# Patient Record
Sex: MALE | Race: WHITE | Employment: FULL TIME | URBAN - METROPOLITAN AREA
[De-identification: names, ages, dates, MRNs, and addresses within clinical notes are randomized per-mention and may not be internally consistent; named-entity substitution may affect disease eponyms.]

---

## 2020-08-11 ENCOUNTER — OFFICE VISIT (OUTPATIENT)
Dept: URGENT CARE | Facility: URGENT CARE | Age: 40
End: 2020-08-11
Payer: COMMERCIAL

## 2020-08-11 VITALS
SYSTOLIC BLOOD PRESSURE: 107 MMHG | WEIGHT: 167.6 LBS | RESPIRATION RATE: 20 BRPM | DIASTOLIC BLOOD PRESSURE: 71 MMHG | TEMPERATURE: 99 F | OXYGEN SATURATION: 98 % | HEART RATE: 80 BPM
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
## 2020-08-11 PROCEDURE — 90471 IMMUNIZATION ADMIN: CPT | Performed by: NURSE PRACTITIONER

## 2020-08-11 PROCEDURE — 99203 OFFICE O/P NEW LOW 30 MIN: CPT | Mod: 25 | Performed by: NURSE PRACTITIONER

## 2020-08-11 PROCEDURE — 90715 TDAP VACCINE 7 YRS/> IM: CPT | Performed by: NURSE PRACTITIONER

## 2020-08-11 RX ORDER — DOXYCYCLINE 100 MG/1
100 CAPSULE ORAL 2 TIMES DAILY
Qty: 14 CAPSULE | Refills: 0 | Status: SHIPPED | OUTPATIENT
Start: 2020-08-11 | End: 2020-08-18

## 2020-08-11 ASSESSMENT — ENCOUNTER SYMPTOMS
RHINORRHEA: 0
VOMITING: 0
SORE THROAT: 0
CHILLS: 0
SHORTNESS OF BREATH: 0
DIARRHEA: 0
NECK PAIN: 1
COUGH: 0
NAUSEA: 0
FEVER: 0
DIAPHORESIS: 0

## 2020-08-11 NOTE — PROGRESS NOTES
SUBJECTIVE:   Reed Arellano is a 40 year old male presenting with a chief complaint of   Chief Complaint   Patient presents with     Puncture Wound     Left hand poked by scott nail on Saturday. Jaw pain, trouble swallowing, all over muscle aches. Does not remember having a tetanus shot as an adult        He is a new patient of [facility].    Subjective  Reed Arellano is a 40-year-old male presenting to urgent care with a puncture wound on the left hand.  He was poked by a scott nail on Saturday while at work.  He states that the wound has healed but he is having mild jaw pain, neck pain  slight difficulty in swallowing.  He is also admitting to muscle aches.  He does not remember getting the tetanus shot as an adult.  He does not remember having a tetanus shot as an adult.  Denies chest pain, fever, cough, shortness of breath.  He denies any exposure to coronavirus.    Review of Systems   Constitutional: Negative for chills, diaphoresis and fever.   HENT: Negative for congestion, ear pain, rhinorrhea and sore throat.         Jaw pain.   Respiratory: Negative for cough and shortness of breath.    Gastrointestinal: Negative for diarrhea, nausea and vomiting.        Mild difficulty in swallowing.   Musculoskeletal: Positive for neck pain.        Muscle aches.   Skin:        Puncture wounds to the left hand.   All other systems reviewed and are negative.      History reviewed. No pertinent past medical history.  History reviewed. No pertinent family history.  Current Outpatient Medications   Medication Sig Dispense Refill     doxycycline hyclate (VIBRAMYCIN) 100 MG capsule Take 1 capsule (100 mg) by mouth 2 times daily for 7 days 14 capsule 0     Social History     Tobacco Use     Smoking status: Current Every Day Smoker     Types: Cigarettes     Smokeless tobacco: Never Used   Substance Use Topics     Alcohol use: Not on file       OBJECTIVE  /71   Pulse 80   Temp 99  F (37.2  C) (Tympanic)   Resp 20   Wt 76  kg (167 lb 9.6 oz)   SpO2 98%     Physical Exam  Vitals signs and nursing note reviewed.   Constitutional:       General: He is not in acute distress.     Appearance: He is well-developed. He is not diaphoretic.   HENT:      Head: Normocephalic and atraumatic.      Jaw: There is normal jaw occlusion. No tenderness, swelling or pain on movement.      Right Ear: Tympanic membrane and external ear normal.      Left Ear: Tympanic membrane and external ear normal.   Eyes:      Pupils: Pupils are equal, round, and reactive to light.   Neck:      Musculoskeletal: Normal range of motion and neck supple.   Pulmonary:      Effort: Pulmonary effort is normal. No respiratory distress.      Breath sounds: Normal breath sounds. No rales.   Chest:      Chest wall: No tenderness.   Musculoskeletal:      Comments: Cervical spine exam:   No focal tenderness is noted along spinous processes.    Range of Motion is intact in all planes.   Lymphadenopathy:      Cervical: No cervical adenopathy.   Skin:     General: Skin is warm and dry.      Comments: To dotted areas on the left hand with mild tenderness.   Neurological:      Mental Status: He is alert.      Cranial Nerves: No cranial nerve deficit.         ASSESSMENT:      ICD-10-CM    1. Puncture wound of skin from metal nail  W45.0XXA doxycycline hyclate (VIBRAMYCIN) 100 MG capsule     TDAP, IM (10 - 64 YRS) - Adacel            PLAN:  Plan of treatment is discussed.The benefits, risks and potential side effects of medications discussed. Black box warning discussed as relevant.  All questions are answered.  Instructions were given  to follow up immediately if any adverse reactions or worsening symptoms develop.   Understanding of plan of care was verbalized by patient        Patient Instructions     Patient Education     Tetanus Antibody  Does this test have other names?  Vaccine responsiveness test  What is this test?  This test looks for tetanus antibody in your blood.  Tetanus is a  serious disease caused by the toxin from Clostridium tetani bacteria. The toxin makes its way into the nervous system and causes muscle spasms and rigid muscles.  If you have been vaccinated for tetanus in the past, this test should show that you have enough antibodies against the disease. If your levels are too low, you will be revaccinated. The test will be repeated after at least a month. Several variations of the tetanus vaccine are available. The vaccine is recommended as a series in childhood. A booster vaccine is recommended every 10 years for teens and adults.  If you've never had a tetanus vaccine or you've been exposed to tetanus, you'll get vaccinated. You may return later to have your tetanus antibody levels checked to make sure the vaccine is working.  Why do I need this test?  You may need this test if your healthcare provider wants to make sure your immune system can protect you against tetanus. Or you may need it to see if you have a problem that prevents your immune system from working properly. This test can help diagnose:    Severe combined immunodeficiency, or SCID    Common variable immunodeficiency, or CVID    Antibody deficiencies    Immunoglobulin deficiencies    Spleen problems    HIV infection  You may also have this test if you get unusual infections, need antibiotics more often than usual, or have infections in your sinuses, ears, or digestive tract that keep coming back.  What other tests might I have along with this test?  Your healthcare provider may also order tests to check your immune system's response to the diphtheria vaccine or the Streptococcus pneumoniae vaccine.  What do my test results mean?  Test results may vary depending on your age, gender, health history, the method used for the test, and other things. Your test results may not mean you have a problem. Ask your healthcare provider what your test results mean for you.   Results are given in international units per  milliliter (IU/mL). Normal results are usually greater than 0.1 IU/mL. If the test shows your levels are at least that high, it means your immune system had a normal response to the tetanus vaccine.  How is this test done?  The test is done with a blood sample. A needle is used to draw blood from a vein in your arm or hand.   Does this test pose any risks?  Having a blood test with a needle carries some risks. These include bleeding, infection, bruising, and feeling lightheaded. When the needle pricks your arm or hand, you may feel a slight sting or pain. Afterward, the site may be sore.   Tetanus vaccine are combined with vaccines against diphtheria and sometimes pertussis. They may cause these side effects:    Pain, redness, and swelling at the injection site    Fever or headache    Fatigue    Stomach upset    Allergic reactions  What might affect my test results?  Getting gammaglobulin treatment in the 8 months before the test or immune globulin in the 5 months before the test will affect your results.  Having cancer, getting chemotherapy, or taking certain medicines that suppress the immune system can also affect your results.  How do I get ready for this test?  Tell your healthcare provider if you have cancer, are taking immune-suppressing medicine, or have received gammaglobulin or immune globulin in recent months. Be sure your healthcare provider knows about all other medicines, herbs, vitamins, and supplements you are taking. This includes medicines that don't need a prescription and any illicit drugs you may use.    9351-8318 [employer]. [address] [place] [address]. All rights reserved. This information is not intended as a substitute for professional medical care. Always follow your healthcare professional's instructions.           Patient Education     Tetanus Antibody  Does this test have other names?  Vaccine responsiveness test  What is this test?  This test looks for  tetanus antibody in your blood.  Tetanus is a serious disease caused by the toxin from Clostridium tetani bacteria. The toxin makes its way into the nervous system and causes muscle spasms and rigid muscles.  If you have been vaccinated for tetanus in the past, this test should show that you have enough antibodies against the disease. If your levels are too low, you will be revaccinated. The test will be repeated after at least a month. Several variations of the tetanus vaccine are available. The vaccine is recommended as a series in childhood. A booster vaccine is recommended every 10 years for teens and adults.  If you've never had a tetanus vaccine or you've been exposed to tetanus, you'll get vaccinated. You may return later to have your tetanus antibody levels checked to make sure the vaccine is working.  Why do I need this test?  You may need this test if your healthcare provider wants to make sure your immune system can protect you against tetanus. Or you may need it to see if you have a problem that prevents your immune system from working properly. This test can help diagnose:    Severe combined immunodeficiency, or SCID    Common variable immunodeficiency, or CVID    Antibody deficiencies    Immunoglobulin deficiencies    Spleen problems    HIV infection  You may also have this test if you get unusual infections, need antibiotics more often than usual, or have infections in your sinuses, ears, or digestive tract that keep coming back.  What other tests might I have along with this test?  Your healthcare provider may also order tests to check your immune system's response to the diphtheria vaccine or the Streptococcus pneumoniae vaccine.  What do my test results mean?  Test results may vary depending on your age, gender, health history, the method used for the test, and other things. Your test results may not mean you have a problem. Ask your healthcare provider what your test results mean for  you.   Results are given in international units per milliliter (IU/mL). Normal results are usually greater than 0.1 IU/mL. If the test shows your levels are at least that high, it means your immune system had a normal response to the tetanus vaccine.  How is this test done?  The test is done with a blood sample. A needle is used to draw blood from a vein in your arm or hand.   Does this test pose any risks?  Having a blood test with a needle carries some risks. These include bleeding, infection, bruising, and feeling lightheaded. When the needle pricks your arm or hand, you may feel a slight sting or pain. Afterward, the site may be sore.   Tetanus vaccine are combined with vaccines against diphtheria and sometimes pertussis. They may cause these side effects:    Pain, redness, and swelling at the injection site    Fever or headache    Fatigue    Stomach upset    Allergic reactions  What might affect my test results?  Getting gammaglobulin treatment in the 8 months before the test or immune globulin in the 5 months before the test will affect your results.  Having cancer, getting chemotherapy, or taking certain medicines that suppress the immune system can also affect your results.  How do I get ready for this test?  Tell your healthcare provider if you have cancer, are taking immune-suppressing medicine, or have received gammaglobulin or immune globulin in recent months. Be sure your healthcare provider knows about all other medicines, herbs, vitamins, and supplements you are taking. This includes medicines that don't need a prescription and any illicit drugs you may use.    7315-2763 [employer]. [address] [place] [address]. All rights reserved. This information is not intended as a substitute for professional medical care. Always follow your healthcare professional's instructions.           Patient Education     Immunization Information: Tetanus  You received a tetanus shot today. This  "shot was given to protect you from an infection with the tetanus bacteria. These bacteria are found in the soil. You can get a tetanus infection (also called \"lockjaw\") from a dirty wound, cut, puncture, abrasion, or other break in the skin. Tetanus can be deadly. For this reason, it is vital to be vaccinated against it. If you have never had a tetanus vaccination, 3 shots are needed to fully protect you. You received the first shot today. You can get your next 2 shots:    From your usual healthcare provider    From the local public health department    From our facility  When to have your next 2 shots    Return for your next shot in 4 weeks: _________________________________________________    Return for your last shot in 6 to 12 months:  ___________________________________________________  Once you are finished with the 3 shots, you will be fully immunized. This means you will be protected against getting tetanus for up to 10 years.  If you have an injury that is very risky before this time, you may receive a \"booster\" shot.  To help you remember the date of your last tetanus shot, write it on the back of your 's license or other ID.  When to seek medical advice  After a tetanus shot, most people have only mild soreness in the arm for a day or so. Contact your healthcare provider or this facility if you develop symptoms of allergic reaction, which include:    Pain, redness, and swelling at the injection site    Trouble breathing    Rash  Date Last Reviewed: 4/1/2018 2000-2019 [employer]. [address] [place] [address], [place] [address]. All rights reserved. This information is not intended as a substitute for professional medical care. Always follow your healthcare professional's instructions.           Patient Education     Puncture Wound    A puncture wound occurs when a pointed object pushes into the skin. It may go into the tissues below the skin, including fat and muscle. This type of wound " is narrow and deep and can be hard to clean. Because of this, puncture wounds are at high risk for becoming infected.  X-rays may be done to check the wound for objects stuck under the skin. Your may also need a tetanus shot. This is given if you are not up-to-date on this vaccination and the object that caused the wound may lead to tetanus.  Home care    Your healthcare provider may prescribe an antibiotic. This is to help prevent infection. Follow all instructions for taking this medicine. Take the medicine every day until it is gone or you are told to stop. You should not have any left over.    The healthcare provider may prescribe medicines for pain. Follow instructions for taking them.    You can take acetaminophen or ibuprofen for pain, unless you were given a different pain medicine to use.     Follow the healthcare provider s instructions on how to care for the wound.    Keep the wound clean and dry. Don't get the wound wet until you are told it is OK to do so. If the area gets wet, gently pat it dry with a clean cloth. Replace the wet bandage with a dry one.    If a bandage was applied and it becomes wet or dirty, replace it. Otherwise, leave it in place for the first 24 hours.    Once you can get the wound wet, you may shower as usual but don't soak the wound in water (no tub baths or swimming)    Even with proper treatment, a puncture wound may become infected. Check the wound daily for signs of infection listed below.  Follow-up care  Follow up with your healthcare provider, or as advised.   When to seek medical advice  Call your healthcare provider right away if any of these occur:    Signs of infection, including:  ? Increasing redness or swelling around the wound  ? Increased warmth of the wound  ? Worsening pain  ? Red streaking lines away from the wound  ? Draining pus    Fever of 100.4 F (38. C) or higher, or as directed by your healthcare provider    Wound changes colors    Numbness around the  wound    Decreased movement around the injured area  Date Last Reviewed: 3/1/2018    9575-8985 [employer]. [address] [facility], [place] [address]. All rights reserved. This information is not intended as a substitute for professional medical care. Always follow your healthcare professional's instructions.

## 2020-08-11 NOTE — PATIENT INSTRUCTIONS
Patient Education     Tetanus Antibody  Does this test have other names?  Vaccine responsiveness test  What is this test?  This test looks for tetanus antibody in your blood.  Tetanus is a serious disease caused by the toxin from Clostridium tetani bacteria. The toxin makes its way into the nervous system and causes muscle spasms and rigid muscles.  If you have been vaccinated for tetanus in the past, this test should show that you have enough antibodies against the disease. If your levels are too low, you will be revaccinated. The test will be repeated after at least a month. Several variations of the tetanus vaccine are available. The vaccine is recommended as a series in childhood. A booster vaccine is recommended every 10 years for teens and adults.  If you've never had a tetanus vaccine or you've been exposed to tetanus, you'll get vaccinated. You may return later to have your tetanus antibody levels checked to make sure the vaccine is working.  Why do I need this test?  You may need this test if your healthcare provider wants to make sure your immune system can protect you against tetanus. Or you may need it to see if you have a problem that prevents your immune system from working properly. This test can help diagnose:    Severe combined immunodeficiency, or SCID    Common variable immunodeficiency, or CVID    Antibody deficiencies    Immunoglobulin deficiencies    Spleen problems    HIV infection  You may also have this test if you get unusual infections, need antibiotics more often than usual, or have infections in your sinuses, ears, or digestive tract that keep coming back.  What other tests might I have along with this test?  Your healthcare provider may also order tests to check your immune system's response to the diphtheria vaccine or the Streptococcus pneumoniae vaccine.  What do my test results mean?  Test results may vary depending on your age, gender, health history, the method used for the  test, and other things. Your test results may not mean you have a problem. Ask your healthcare provider what your test results mean for you.   Results are given in international units per milliliter (IU/mL). Normal results are usually greater than 0.1 IU/mL. If the test shows your levels are at least that high, it means your immune system had a normal response to the tetanus vaccine.  How is this test done?  The test is done with a blood sample. A needle is used to draw blood from a vein in your arm or hand.   Does this test pose any risks?  Having a blood test with a needle carries some risks. These include bleeding, infection, bruising, and feeling lightheaded. When the needle pricks your arm or hand, you may feel a slight sting or pain. Afterward, the site may be sore.   Tetanus vaccine are combined with vaccines against diphtheria and sometimes pertussis. They may cause these side effects:    Pain, redness, and swelling at the injection site    Fever or headache    Fatigue    Stomach upset    Allergic reactions  What might affect my test results?  Getting gammaglobulin treatment in the 8 months before the test or immune globulin in the 5 months before the test will affect your results.  Having cancer, getting chemotherapy, or taking certain medicines that suppress the immune system can also affect your results.  How do I get ready for this test?  Tell your healthcare provider if you have cancer, are taking immune-suppressing medicine, or have received gammaglobulin or immune globulin in recent months. Be sure your healthcare provider knows about all other medicines, herbs, vitamins, and supplements you are taking. This includes medicines that don't need a prescription and any illicit drugs you may use.    6102-6727 [employer]. [address] [place] [address], [place] [address]. All rights reserved. This information is not intended as a substitute for professional medical care. Always follow your healthcare  professional's instructions.           Patient Education     Tetanus Antibody  Does this test have other names?  Vaccine responsiveness test  What is this test?  This test looks for tetanus antibody in your blood.  Tetanus is a serious disease caused by the toxin from Clostridium tetani bacteria. The toxin makes its way into the nervous system and causes muscle spasms and rigid muscles.  If you have been vaccinated for tetanus in the past, this test should show that you have enough antibodies against the disease. If your levels are too low, you will be revaccinated. The test will be repeated after at least a month. Several variations of the tetanus vaccine are available. The vaccine is recommended as a series in childhood. A booster vaccine is recommended every 10 years for teens and adults.  If you've never had a tetanus vaccine or you've been exposed to tetanus, you'll get vaccinated. You may return later to have your tetanus antibody levels checked to make sure the vaccine is working.  Why do I need this test?  You may need this test if your healthcare provider wants to make sure your immune system can protect you against tetanus. Or you may need it to see if you have a problem that prevents your immune system from working properly. This test can help diagnose:    Severe combined immunodeficiency, or SCID    Common variable immunodeficiency, or CVID    Antibody deficiencies    Immunoglobulin deficiencies    Spleen problems    HIV infection  You may also have this test if you get unusual infections, need antibiotics more often than usual, or have infections in your sinuses, ears, or digestive tract that keep coming back.  What other tests might I have along with this test?  Your healthcare provider may also order tests to check your immune system's response to the diphtheria vaccine or the Streptococcus pneumoniae vaccine.  What do my test results mean?  Test results may vary depending on your age, gender, health  history, the method used for the test, and other things. Your test results may not mean you have a problem. Ask your healthcare provider what your test results mean for you.   Results are given in international units per milliliter (IU/mL). Normal results are usually greater than 0.1 IU/mL. If the test shows your levels are at least that high, it means your immune system had a normal response to the tetanus vaccine.  How is this test done?  The test is done with a blood sample. A needle is used to draw blood from a vein in your arm or hand.   Does this test pose any risks?  Having a blood test with a needle carries some risks. These include bleeding, infection, bruising, and feeling lightheaded. When the needle pricks your arm or hand, you may feel a slight sting or pain. Afterward, the site may be sore.   Tetanus vaccine are combined with vaccines against diphtheria and sometimes pertussis. They may cause these side effects:    Pain, redness, and swelling at the injection site    Fever or headache    Fatigue    Stomach upset    Allergic reactions  What might affect my test results?  Getting gammaglobulin treatment in the 8 months before the test or immune globulin in the 5 months before the test will affect your results.  Having cancer, getting chemotherapy, or taking certain medicines that suppress the immune system can also affect your results.  How do I get ready for this test?  Tell your healthcare provider if you have cancer, are taking immune-suppressing medicine, or have received gammaglobulin or immune globulin in recent months. Be sure your healthcare provider knows about all other medicines, herbs, vitamins, and supplements you are taking. This includes medicines that don't need a prescription and any illicit drugs you may use.    [id number] [employer]. [address] [place] [address], [place] [address]. All rights reserved. This information is not intended as a substitute for professional medical  "care. Always follow your healthcare professional's instructions.           Patient Education     Immunization Information: Tetanus  You received a tetanus shot today. This shot was given to protect you from an infection with the tetanus bacteria. These bacteria are found in the soil. You can get a tetanus infection (also called \"lockjaw\") from a dirty wound, cut, puncture, abrasion, or other break in the skin. Tetanus can be deadly. For this reason, it is vital to be vaccinated against it. If you have never had a tetanus vaccination, 3 shots are needed to fully protect you. You received the first shot today. You can get your next 2 shots:    From your usual healthcare provider    From the local public health department    From our facility  When to have your next 2 shots    Return for your next shot in 4 weeks: _________________________________________________    Return for your last shot in 6 to 12 months:  ___________________________________________________  Once you are finished with the 3 shots, you will be fully immunized. This means you will be protected against getting tetanus for up to 10 years.  If you have an injury that is very risky before this time, you may receive a \"booster\" shot.  To help you remember the date of your last tetanus shot, write it on the back of your 's license or other ID.  When to seek medical advice  After a tetanus shot, most people have only mild soreness in the arm for a day or so. Contact your healthcare provider or this facility if you develop symptoms of allergic reaction, which include:    Pain, redness, and swelling at the injection site    Trouble breathing    Rash  Date Last Reviewed: 4/1/2018 2000-2019 [employer]. [address] [place] [address]. All rights reserved. This information is not intended as a substitute for professional medical care. Always follow your healthcare professional's instructions.           Patient Education "     Puncture Wound    A puncture wound occurs when a pointed object pushes into the skin. It may go into the tissues below the skin, including fat and muscle. This type of wound is narrow and deep and can be hard to clean. Because of this, puncture wounds are at high risk for becoming infected.  X-rays may be done to check the wound for objects stuck under the skin. Your may also need a tetanus shot. This is given if you are not up-to-date on this vaccination and the object that caused the wound may lead to tetanus.  Home care    Your healthcare provider may prescribe an antibiotic. This is to help prevent infection. Follow all instructions for taking this medicine. Take the medicine every day until it is gone or you are told to stop. You should not have any left over.    The healthcare provider may prescribe medicines for pain. Follow instructions for taking them.    You can take acetaminophen or ibuprofen for pain, unless you were given a different pain medicine to use.     Follow the healthcare provider s instructions on how to care for the wound.    Keep the wound clean and dry. Don't get the wound wet until you are told it is OK to do so. If the area gets wet, gently pat it dry with a clean cloth. Replace the wet bandage with a dry one.    If a bandage was applied and it becomes wet or dirty, replace it. Otherwise, leave it in place for the first 24 hours.    Once you can get the wound wet, you may shower as usual but don't soak the wound in water (no tub baths or swimming)    Even with proper treatment, a puncture wound may become infected. Check the wound daily for signs of infection listed below.  Follow-up care  Follow up with your healthcare provider, or as advised.   When to seek medical advice  Call your healthcare provider right away if any of these occur:    Signs of infection, including:  ? Increasing redness or swelling around the wound  ? Increased warmth of the wound  ? Worsening pain  ? Red  streaking lines away from the wound  ? Draining pus    Fever of 100.4 F (38. C) or higher, or as directed by your healthcare provider    Wound changes colors    Numbness around the wound    Decreased movement around the injured area  Date Last Reviewed: 3/1/2018    [id number] [employer]. [address] [place] [id number]. All rights reserved. This information is not intended as a substitute for professional medical care. Always follow your healthcare professional's instructions.

## 2020-11-03 ENCOUNTER — OFFICE VISIT (OUTPATIENT)
Dept: FAMILY MEDICINE | Facility: CLINIC | Age: 40
End: 2020-11-03
Payer: COMMERCIAL

## 2020-11-03 VITALS
OXYGEN SATURATION: 100 % | BODY MASS INDEX: 24.29 KG/M2 | WEIGHT: 164 LBS | SYSTOLIC BLOOD PRESSURE: 128 MMHG | DIASTOLIC BLOOD PRESSURE: 74 MMHG | HEART RATE: 79 BPM | TEMPERATURE: 98.7 F | HEIGHT: 69 IN

## 2020-11-03 DIAGNOSIS — K52.9 CHRONIC DIARRHEA: Primary | ICD-10-CM

## 2020-11-03 DIAGNOSIS — Z13.220 SCREENING FOR HYPERLIPIDEMIA: ICD-10-CM

## 2020-11-03 DIAGNOSIS — K58.0 IRRITABLE BOWEL SYNDROME WITH DIARRHEA: ICD-10-CM

## 2020-11-03 LAB
ALBUMIN SERPL-MCNC: 4.2 G/DL (ref 3.4–5)
ALP SERPL-CCNC: 128 U/L (ref 40–150)
ALT SERPL W P-5'-P-CCNC: 33 U/L (ref 0–70)
ANION GAP SERPL CALCULATED.3IONS-SCNC: 6 MMOL/L (ref 3–14)
AST SERPL W P-5'-P-CCNC: 14 U/L (ref 0–45)
BASOPHILS # BLD AUTO: 0 10E9/L (ref 0–0.2)
BASOPHILS NFR BLD AUTO: 0.3 %
BILIRUB SERPL-MCNC: 0.6 MG/DL (ref 0.2–1.3)
BUN SERPL-MCNC: 11 MG/DL (ref 7–30)
CALCIUM SERPL-MCNC: 9 MG/DL (ref 8.5–10.1)
CHLORIDE SERPL-SCNC: 106 MMOL/L (ref 94–109)
CO2 SERPL-SCNC: 27 MMOL/L (ref 20–32)
CREAT SERPL-MCNC: 0.69 MG/DL (ref 0.66–1.25)
DIFFERENTIAL METHOD BLD: ABNORMAL
EOSINOPHIL # BLD AUTO: 0.3 10E9/L (ref 0–0.7)
EOSINOPHIL NFR BLD AUTO: 3.1 %
ERYTHROCYTE [DISTWIDTH] IN BLOOD BY AUTOMATED COUNT: 11.8 % (ref 10–15)
GFR SERPL CREATININE-BSD FRML MDRD: >90 ML/MIN/{1.73_M2}
GLUCOSE SERPL-MCNC: 82 MG/DL (ref 70–99)
HCT VFR BLD AUTO: 39.9 % (ref 40–53)
HGB BLD-MCNC: 14.5 G/DL (ref 13.3–17.7)
LYMPHOCYTES # BLD AUTO: 1.9 10E9/L (ref 0.8–5.3)
LYMPHOCYTES NFR BLD AUTO: 21.5 %
MCH RBC QN AUTO: 33.6 PG (ref 26.5–33)
MCHC RBC AUTO-ENTMCNC: 36.3 G/DL (ref 31.5–36.5)
MCV RBC AUTO: 93 FL (ref 78–100)
MONOCYTES # BLD AUTO: 0.6 10E9/L (ref 0–1.3)
MONOCYTES NFR BLD AUTO: 6.8 %
NEUTROPHILS # BLD AUTO: 5.9 10E9/L (ref 1.6–8.3)
NEUTROPHILS NFR BLD AUTO: 68.3 %
PLATELET # BLD AUTO: 204 10E9/L (ref 150–450)
POTASSIUM SERPL-SCNC: 3.4 MMOL/L (ref 3.4–5.3)
PROT SERPL-MCNC: 7.1 G/DL (ref 6.8–8.8)
RBC # BLD AUTO: 4.31 10E12/L (ref 4.4–5.9)
SODIUM SERPL-SCNC: 139 MMOL/L (ref 133–144)
TSH SERPL DL<=0.005 MIU/L-ACNC: 0.89 MU/L (ref 0.4–4)
WBC # BLD AUTO: 8.7 10E9/L (ref 4–11)

## 2020-11-03 PROCEDURE — 80050 GENERAL HEALTH PANEL: CPT | Performed by: PHYSICIAN ASSISTANT

## 2020-11-03 PROCEDURE — 80061 LIPID PANEL: CPT | Performed by: PHYSICIAN ASSISTANT

## 2020-11-03 PROCEDURE — 99203 OFFICE O/P NEW LOW 30 MIN: CPT | Performed by: PHYSICIAN ASSISTANT

## 2020-11-03 PROCEDURE — 36415 COLL VENOUS BLD VENIPUNCTURE: CPT | Performed by: PHYSICIAN ASSISTANT

## 2020-11-03 ASSESSMENT — MIFFLIN-ST. JEOR: SCORE: 1626.34

## 2020-11-03 ASSESSMENT — PAIN SCALES - GENERAL: PAINLEVEL: NO PAIN (0)

## 2020-11-03 NOTE — PATIENT INSTRUCTIONS
At [facility], we strive to deliver an exceptional experience to you, every time we see you. If you receive a survey, please complete it as we do value your feedback.  If you have MyChart, you can expect to receive results automatically within 24 hours of their completion.  Your provider will send a note interpreting your results as well.   If you do not have MyChart, you should receive your results in about a week by mail.    Your care team:                            Family Medicine Internal Medicine   MD Les Harkins, MD Scar Alejandro MD Katya Georgiev PA-C Megan Hill, APRN [facility]    Toñito Campos, MD Pediatrics   Roland Titus, PADianneC  Jazzmine Christine, CNP MD Johnna Sheehan APRN CNP   MD Georgie Mchugh MD Deborah Mielke, MD Bisi Blanton, APRN CNP  Maira Hansen, PABROOKLYNN Broderick, CNP  MD Beverly Arias MD Angela Wermerskirchen, MD      Clinic hours: Monday - Thursday 7 am-7 pm; Fridays 7 am-5 pm.   Urgent care: Monday - Friday 11 am-9 pm; Saturday and Sunday 9 am-5 pm.    Clinic: [phone]       [facility]: Monday - Thursday 8 am - 7 pm; Friday 8 am - 6 pm  [facility]: [phone]     Use www.oncare.org for 24/7 diagnosis and treatment of dozens of conditions.      How to Quit Smoking  Smoking is one of the hardest habits to break. About half of all people who have ever smoked have been able to quit. Most people who still smoke want to quit. Here are some of the best ways to stop smoking.    Keep trying  Most smokers make many attempts at quitting before they are successful. It s important not to give up.  Go cold turkey  Most former smokers quit cold turkey (all at once). Trying to cut back gradually doesn't seem to work as well, perhaps because it continues the smoking habit. Also, it is possible to inhale more  while smoking fewer cigarettes. This results in the same amount of nicotine in your body.  Get support  Support programs can be a big help, especially for heavy smokers. These groups offer lectures, ways to change behavior, and peer support. Here are some ways to find a support program:    Free national quitline: 800-QUIT-NOW (387-588-3744).    [facility] quit-smoking programs.    American Lung Association: (457.982.9758).    American Cancer Society (124-358-8537).  Support at home is important too. Nonsmokers can offer praise and encouragement. If the smoker in your life finds it hard to quit, encourage them to keep trying.  Over-the-counter medicines  Nicotine replacement therapy may make quitting easier. Certain aids, such as the nicotine patch, gum, and lozenges, are available without a prescription. It is best to use these under a doctor s care, though. The skin patch provides a steady supply of nicotine. Nicotine gum and lozenges give temporary bursts of low levels of nicotine. Both methods reduce the craving for cigarettes. Warning: If you have nausea, vomiting, dizziness, weakness, or a fast heartbeat, stop using these products and see your doctor.  Prescription medicines  After reviewing your smoking patterns and past attempts to quit, your doctor may offer a prescription medicine such as bupropion, varenicline, a nicotine inhaler, or nasal spray. Each has advantages and side effects. Your doctor can review these with you.  Health benefits of quitting  The benefits of quitting start right away and keep improving the longer you go without smoking. These benefits occur at any age.  So whether you are 17 or 70, quitting is a good decision. Some of the benefits include:    20 minutes: Blood pressure and pulse return to normal.    8 hours: Oxygen levels return to normal.    2 days: Ability to smell and taste begin to improve as damaged nerves regrow.    2 to 3 weeks: Circulation and lung function improve.    1 to  "9 months: Coughing, congestion, and shortness of breath decrease; tiredness decreases.    1 year: Risk of heart attack decreases by half.    5 years: Risk of lung cancer decreases by half; risk of stroke becomes the same as a nonsmoker s.  For more on how to quit smoking, try these online resources:     Smokefree.gov    \"Clearing the Air\" booklet from the National Cancer [place]: smokefree.gov/sites/default/files/pdf/clearing-the-air-accessible.pdf  Date Last Reviewed: 3/1/2017    6208-8279 [employer]. [address] [place] [address]. All rights reserved. This information is not intended as a substitute for professional medical care. Always follow your healthcare professional's instructions.             Patient Education     Diet and Lifestyle Tips for Irritable Bowel Syndrome (IBS)    Your healthcare professional may suggest some lifestyle changes to help control your IBS. Changing your diet and managing stress are 2 of the most important. Follow your healthcare provider s instructions and try some of the suggestions below.   Change your diet  Your diet may be an important cause of IBS symptoms. You may want to try the following:    Pay attention to what foods bother you, and stay away from them. For example, dairy products are hard for some people to digest. Lactose-free dairy products may be better for your symptoms.    Don't eat high FODMAP foods. Other common foods that can cause symptoms contain carbohydrates called FODMAPs. These are poorly digested by your body. High FODMAP foods include some fruits such as apples, vegetables such as cabbage, and some dairy. They also include certain sweeteners such as high-fructose corn syrup, sorbitol, and xylitol. Many people find that eating a diet low in FODMAPs can ease symptoms. Talk with your healthcare provider about a low FODMAP diet.    Drink 6 to 8 glasses of water a day.    Don't have caffeine or tobacco. These are muscle stimulants " and can affect the working of your digestive tract.    Don't drink alcohol. It can irritate your digestive tract and make your symptoms worse.    Eat more fiber if constipation is a problem. Fiber makes the stool softer and easier to pass through the colon.    Eat more fiber if diarrhea is a problem. Fiber also helps to bind water. This can help to firm up loose stool.  Reduce stress  If stress or anxiety makes your IBS symptoms worse, learning how to manage stress may help you feel better. Try these tips:     Identify the causes of stress in your life.    Learn new ways to cope with them.    Regular exercise is a great way to relieve stress. It can also help ease constipation. For adults, the CDC recommends 150 minutes of moderate activity each week. It also recommends muscle-strengthening activities 2 days a week. If this sounds like a lot of time, the CDC suggests breaking physical activity into 10-minute blocks and spreading it out over a week. Developing a schedule that works for you is the key to a successful exercise program.  [employer] last reviewed this educational content on 8/1/2018 2000-2020 [employer]. [address] [place] [address]. All rights reserved. This information is not intended as a substitute for professional medical care. Always follow your healthcare professional's instructions.           Patient Education     Irritable Bowel Syndrome    Irritable bowel syndrome (IBS) is a disorder of the intestines. It is not a disease, but a group of symptoms caused by changes in the way the intestines work including how they move, secrete, absorb, and transit pain sensations. It is fairly common, but the cause is not well understood. There are other conditions that cause IBS symptoms, so make sure to speak with your provider to make sure that further evaluation isn't needed.  Symptoms of IBS include:    Belly pain, discomfort, and cramping    Diarrhea    Constipation or dry,  hard stools    Mucous stool    Bloating    Feeling of incomplete bowel movements  It usually results in one of 3 patterns of symptoms:    Chronic belly pain and constipation    Recurring episodes of diarrhea, with belly pain or discomfort    Alternating diarrhea and constipation  Home care  The goal of treatment is to control and relieve your symptoms, so you can lead a full and active life. There is no cure for IBS. But it can be managed.  Diet  Your diet did not cause your IBS, but it can affect it. Diet and food choices may cause IBS symptoms in some people, but not everyone. No one diet works for everyone. Finding the best foods for you may take trial and error. Keep a food log to help find what foods made your symptoms worse. Below are some tips that may help you.    Eat more slowly. Eat smaller amounts at a time, but more often. Remember, you can always eat more, but cannot eat less once you ve eaten too much.    In general, fiber is very helpful for both constipation and diarrhea. However, insoluble fiber can worsen bloating, cramping, and gas. Insoluble fiber can be found in wheat bran, certain vegetables, and whole-grains. Soluble fiber is in such foods as oat bran, barley, nuts, seeds, bean, lentils, peas, and some fruits and vegetables. Increase fiber slowly and choose a product that includes soluble fiber. It helps to keep a food diary and write down the symptoms you have with certain foods.    Try lactose-free dairy products. many people are intolerant to lactose.    Try cutting out foods that are high in fat and fatty meats.    You can control bloating or passing excess gas. Be careful with  gassy  vegetables and fruits like beans, cabbage, broccoli, and cauliflower.  Other foods called FODMAPs are a type of carbohydrate that can cause these symptoms and diarrhea. They are poorly digestible carbohydrates. Some FODMAP examples include honey, apples, pears, nectarines, lima beans, and cabbage. Talk with  your provider about how to choose low FODMAP foods if you are sensitive to them.    Be careful with carbonated beverages and fruit juices. They can make your bloating and diarrhea worse.    Caffeine, alcohol, and stimulants can make symptoms worse. These include coffee, tea, sodas, energy drinks, and chocolate.    IBS diet guidelines can be confusing. Ask your provider for a referral to a registered dietitian. This professional can help you make sense of IBS diet suggestions.  Lifestyle    Look for factors that seem to worsen your symptoms. These include stress and emotions.     Although stress does not cause IBS, it may trigger flare-ups. Counseling can help you learn to handle stress. So can self-help measures like exercise, yoga, and meditation.    Depression can occur along with IBS. Your healthcare provider may prescribe antidepressant medicine. This may help with diarrhea, constipation, and cramping, as well as with symptoms of depression.    Smoking doesn't cause IBS, but can make the symptoms worse.  Medicines  Your healthcare provider may prescribe medicines. Take them as directed. For acute flare-ups of your illness, your provider may give you prescription medicines.    Check with your healthcare provider before taking any medicines for diarrhea.    Don't take anti-inflammatory medicines like ibuprofen or naproxen.    Long-term medicines can be helpful for chronic symptoms    If you have lost enough weight to make you need nutritional supplements, talk to your provider. This may point to another more serious condition.  Follow-up care  Follow up with your healthcare provider, or as advised.  When to seek medical advice  Call your healthcare provider right away if any of these occur:    Belly pain gets worse or does not improve after a bowel movement    Constant belly pain moves to the right-lower belly    You can't keep liquids down because of vomiting    You have severe, persistent diarrhea    You have  blood (red or black color) or mucus in your stool    You have lost significant weight    You feel very weak or dizzy, faint, or have extreme thirst    You have a fever of 100.4 F (38.0 C) or higher, or as directed by your healthcare provider  [employer] last reviewed this educational content on 6/1/2018 2000-2020 [employer]. [address] [place] [address]. All rights reserved. This information is not intended as a substitute for professional medical care. Always follow your healthcare professional's instructions.

## 2020-11-03 NOTE — PROGRESS NOTES
"Subjective     Reed Arellano is a 40 year old male who presents to clinic today for the following health issues:    HPI         Abdominal/Flank Pain  Onset/Duration: a few years  Description:   Character: Cramping and bloating  Location: pelvic region, sometimes LLQ  Radiation: None  Intensity: severe  Progression of Symptoms:  worsening  Accompanying Signs & Symptoms:  Fever/Chills: no  Gas/Bloating: YES  Nausea: no  Vomitting: no  Diarrhea: YES, most days, 2-3 times a day.no nighttime BMs.no blood in the stools.    Constipation: no  Dysuria or Hematuria: no  History:   Trauma: no  Previous similar pain: no  Previous tests done: none  Precipitating factors:   Does the pain change with:     Food: YES    Bowel Movement: no    Urination: no   Other factors:  no  Therapies tried and outcome: None    Does still smoke.  3/4 ppd  Alcohol use on the weekends primarily.  12-24 beers a weekend.  Eats a lot of fast food too.      No fevers or night sweats.  Not generally watery stools.  No family hx colon cancer.                    No Known Allergies    Patient Active Problem List   Diagnosis     Tobacco use disorder     Chronic diarrhea       History reviewed. No pertinent past medical history.    No current outpatient medications on file prior to visit.  No current facility-administered medications on file prior to visit.       Social History     Tobacco Use     Smoking status: Current Every Day Smoker     Types: Cigarettes     Smokeless tobacco: Never Used   Substance Use Topics     Alcohol use: None     Drug use: None       History reviewed. No pertinent family history.      ROS:  General: negative for fever     GI: as above  : negative for dysuria       OBJECTIVE:  /74 (BP Location: Left arm, Patient Position: Chair, Cuff Size: Adult Regular)   Pulse 79   Temp 98.7  F (37.1  C) (Oral)   Ht 1.74 m (5' 8.5\")   Wt 74.4 kg (164 lb)   SpO2 100%   BMI 24.57 kg/m     General:   awake, alert, and cooperative.  " NAD.   Head: Normocephalic, atraumatic.  Eyes: Conjunctiva clear, non icteric.   RESP: Regular rate  ABD: soft, no tenderness to palpation , no rigidity, guarding or rebound , bowel sounds intact  Neuro: Alert and oriented - normal speech.     ASSESSMENT:well appearing,      ICD-10-CM    1. Chronic diarrhea  K52.9 Enteric Bacteria and Virus Panel by TOM Stool     Cryptosporidium/Giardia Immunoassay     Fecal colorectal cancer screen FIT   2. Irritable bowel syndrome with diarrhea  K58.0 CBC with platelets differential     Comprehensive metabolic panel     TSH with free T4 reflex   3. Screening for hyperlipidemia  Z13.220 Lipid panel reflex to direct LDL Non-fasting     JUST IN CASE           PLAN:provided info/discussed on tobacco cessation, cutting back on alcohol use, fodmap trial elimination diet. Will consider colonoscopy pending above work up and continued symptoms.         Advised about symptoms which might herald more serious problems.

## 2020-11-05 DIAGNOSIS — K52.9 CHRONIC DIARRHEA: ICD-10-CM

## 2020-11-05 PROCEDURE — 87328 CRYPTOSPORIDIUM AG IA: CPT | Performed by: PHYSICIAN ASSISTANT

## 2020-11-05 PROCEDURE — 36415 COLL VENOUS BLD VENIPUNCTURE: CPT | Performed by: PHYSICIAN ASSISTANT

## 2020-11-05 PROCEDURE — 87506 IADNA-DNA/RNA PROBE TQ 6-11: CPT | Performed by: PHYSICIAN ASSISTANT

## 2020-11-05 PROCEDURE — 87329 GIARDIA AG IA: CPT | Performed by: PHYSICIAN ASSISTANT

## 2020-11-05 PROCEDURE — 82274 ASSAY TEST FOR BLOOD FECAL: CPT | Performed by: PHYSICIAN ASSISTANT

## 2020-11-06 LAB
C COLI+JEJUNI+LARI FUSA STL QL NAA+PROBE: NOT DETECTED
C PARVUM AG STL QL IA: NEGATIVE
EC STX1 GENE STL QL NAA+PROBE: NOT DETECTED
EC STX2 GENE STL QL NAA+PROBE: NOT DETECTED
ENTERIC PATHOGEN COMMENT: NORMAL
G LAMBLIA AG STL QL IA: NEGATIVE
HEMOCCULT STL QL IA: NEGATIVE
NOROV GI+II ORF1-ORF2 JNC STL QL NAA+PR: NOT DETECTED
RVA NSP5 STL QL NAA+PROBE: NOT DETECTED
SALMONELLA SP RPOD STL QL NAA+PROBE: NOT DETECTED
SHIGELLA SP+EIEC IPAH STL QL NAA+PROBE: NOT DETECTED
SPECIMEN SOURCE: NORMAL
V CHOL+PARA RFBL+TRKH+TNAA STL QL NAA+PR: NOT DETECTED
Y ENTERO RECN STL QL NAA+PROBE: NOT DETECTED

## 2020-11-07 ENCOUNTER — MYC MEDICAL ADVICE (OUTPATIENT)
Dept: FAMILY MEDICINE | Facility: CLINIC | Age: 40
End: 2020-11-07

## 2020-11-09 PROBLEM — E78.5 DYSLIPIDEMIA: Status: ACTIVE | Noted: 2020-11-09

## 2020-11-09 LAB
CHOLEST SERPL-MCNC: 234 MG/DL
HDLC SERPL-MCNC: 47 MG/DL
LDLC SERPL CALC-MCNC: 159 MG/DL
NONHDLC SERPL-MCNC: 187 MG/DL
TRIGL SERPL-MCNC: 142 MG/DL

## 2021-01-04 ENCOUNTER — HEALTH MAINTENANCE LETTER (OUTPATIENT)
Age: 41
End: 2021-01-04

## 2021-04-30 ENCOUNTER — OFFICE VISIT (OUTPATIENT)
Dept: FAMILY MEDICINE | Facility: CLINIC | Age: 41
End: 2021-04-30
Payer: COMMERCIAL

## 2021-04-30 VITALS
RESPIRATION RATE: 20 BRPM | BODY MASS INDEX: 25.33 KG/M2 | HEIGHT: 69 IN | DIASTOLIC BLOOD PRESSURE: 79 MMHG | HEART RATE: 83 BPM | SYSTOLIC BLOOD PRESSURE: 126 MMHG | WEIGHT: 171 LBS | TEMPERATURE: 97 F | OXYGEN SATURATION: 100 %

## 2021-04-30 DIAGNOSIS — J01.90 ACUTE SINUSITIS WITH SYMPTOMS > 10 DAYS: Primary | ICD-10-CM

## 2021-04-30 DIAGNOSIS — F17.200 TOBACCO USE DISORDER: ICD-10-CM

## 2021-04-30 PROCEDURE — 99214 OFFICE O/P EST MOD 30 MIN: CPT | Performed by: NURSE PRACTITIONER

## 2021-04-30 RX ORDER — FLUTICASONE PROPIONATE 50 MCG
1 SPRAY, SUSPENSION (ML) NASAL DAILY
Qty: 11.1 ML | Refills: 0 | Status: SHIPPED | OUTPATIENT
Start: 2021-04-30 | End: 2022-12-13

## 2021-04-30 ASSESSMENT — PAIN SCALES - GENERAL: PAINLEVEL: NO PAIN (0)

## 2021-04-30 ASSESSMENT — MIFFLIN-ST. JEOR: SCORE: 1658.09

## 2021-04-30 NOTE — PROGRESS NOTES
"    Assessment & Plan     Acute sinusitis with symptoms > 10 days  Treating with Augmentin and Flonase to help with sinus drainage, reviewed indications for return to clinic/, continue with humidified air, stay well hydrated, call for new/worsening symptoms.  - amoxicillin-clavulanate (AUGMENTIN) 875-125 MG tablet  Dispense: 20 tablet; Refill: 0  - fluticasone (FLONASE) 50 MCG/ACT nasal spray  Dispense: 11.1 mL; Refill: 0    Tobacco use disorder  We can help[ him quit when he is ready.         Tobacco Cessation:   reports that he has been smoking cigarettes. He has never used smokeless tobacco.  Tobacco Cessation Action Plan: Information offered: Patient not interested at this time    BMI:   Estimated body mass index is 25.62 kg/m  as calculated from the following:    Height as of this encounter: 1.74 m (5' 8.5\").    Weight as of this encounter: 77.6 kg (171 lb).   Weight management plan: Discussed healthy diet and exercise guidelines    Work on weight loss  Regular exercise  See Patient Instructions    No follow-ups on file.    [facility] is a 40 year old who presents for the following health issues     HPI     Concern - Derm  Onset: few years but worse ing the last 5-6 weeks.  Description: Patient complains of a skin spot below the left eye wants checked.  Intensity: severe  Progression of Symptoms:  Worsening, 6 months  Accompanying Signs & Symptoms: numbness 1-1.5 months waxing and waning throughout the day  Previous history of similar problem: none  Precipitating factors:        Worsened by: none  Alleviating factors:        Improved by: none  Therapies tried and outcome: None  No allergy history but he's had some pressure left maxillary sinus region with intermittent  tingling on the skin, no rash. No symptoms upper or lower branch of trigenimal nerve innervation.  He's had upper and lower dental implants 4-5 years ago.    He " "continues to smoke 1 pk cigarettes/week, knows he needs to quit but is not yet ready to commit to this.    Review of Systems   Constitutional, HEENT, cardiovascular, pulmonary, gi and gu systems are negative, except as otherwise noted.      Objective    /79 (BP Location: Right arm, Patient Position: Sitting, Cuff Size: Adult Regular)   Pulse 83   Temp 97  F (36.1  C) (Tympanic)   Resp 20   Ht 1.74 m (5' 8.5\")   Wt 77.6 kg (171 lb)   SpO2 100%   BMI 25.62 kg/m    Body mass index is 25.62 kg/m .  Physical Exam   GENERAL: healthy, alert and no distress  EYES: Eyes grossly normal to inspection, PERRL and conjunctivae and sclerae normal  HENT: ear canals and TM's normal, nose and mouth without ulcers or lesions, mild TTP at left maxillary and ethmoid sinus regions, no purulent nasal discharge  NECK: no adenopathy, no asymmetry, masses, or scars and thyroid normal to palpation  RESP: lungs clear to auscultation - no rales, rhonchi or wheezes  CV: regular rate and rhythm, normal S1 S2, no S3 or S4, no murmur, click or rub, no peripheral edema and peripheral pulses strong  ABDOMEN: soft, nontender, no hepatosplenomegaly, no masses and bowel sounds normal  MS: no gross musculoskeletal defects noted, no edema  SKIN: no suspicious lesions or rashes  NEURO: Normal strength and tone, mentation intact and speech normal  PSYCH: mentation appears normal, affect normal/bright  LYMPH: no cervical adenopathy          "

## 2021-04-30 NOTE — PATIENT INSTRUCTIONS
Patient Education     Sinusitis (Antibiotic Treatment)    The sinuses are air-filled spaces within the bones of the face. They connect to the inside of the nose. Sinusitis is an inflammation of the tissue that lines the sinuses. Sinusitis can occur during a cold. It can also happen due to allergies to pollens and other particles in the air. Sinusitis can cause symptoms of sinus congestion and a feeling of fullness. A sinus infection causes fever, headache, and facial pain. There is often green or yellow fluid draining from the nose or into the back of the throat (post-nasal drip). You have been given antibiotics to treat this condition.   Home care    Take the full course of antibiotics as instructed. Don't stop taking them, even when you feel better.    Drink plenty of water, hot tea, and other liquids as directed by the healthcare provider. This may help thin nasal mucus. It also may help your sinuses drain fluids.    Heat may help soothe painful areas of your face. Use a towel soaked in hot water. Or,  the shower and direct the warm spray onto your face. Using a vaporizer along with a menthol rub at night may also help soothe symptoms.     An expectorant with guaifenesin may help thin nasal mucus and help your sinuses drain fluids. Talk with your provider or pharmacists before taking an over-the-counter (OTC) medicine if you have any questions about it or its side effects..    You can use an OTC decongestant, unless a similar medicine was prescribed to you. Nasal sprays work the fastest. Use one that contains phenylephrine or oxymetazoline. First blow your nose gently. Then use the spray. Don't use these medicines more often than directed on the label. If you do, your symptoms may get worse. You may also take pills that contain pseudoephedrine. Don t use products that combine multiple medicines. This is because side effects may be increased. Read labels. You can also ask the pharmacist for help. (People  with high blood pressure should not use decongestants. They can raise blood pressure.) Talk with your provider or pharmacist if you have any questions about the medicine..    OTC antihistamines may help if allergies contributed to your sinusitis. Talk with your provider or pharmacist if you have any questions about the medicine..    Don't use nasal rinses or irrigation during an acute sinus infection, unless your healthcare provider tells you to. Rinsing may spread the infection to other areas in your sinuses.    Use acetaminophen or ibuprofen to control pain, unless another pain medicine was prescribed to you. If you have chronic liver or kidney disease or ever had a stomach ulcer, talk with your healthcare provider before using these medicines. Never give aspirin to anyone under age 18 who is ill with a fever. It may cause severe liver damage.    Don't smoke. This can make symptoms worse.    Follow-up care  Follow up with your healthcare provider, or as advised.   When to seek medical advice  Call your healthcare provider if any of these occur:     Facial pain or headache that gets worse    Stiff neck    Unusual drowsiness or confusion    Swelling of your forehead or eyelids    Symptoms don't go away in 10 days    Vision problems, such as blurred or double vision    Fever of 100.4 F (38 C) or higher, or as directed by your healthcare provider  Call 911  Call 911 if any of these occur:     Seizure    Trouble breathing    Feeling dizzy or faint    Fingernails, skin or lips look blue, purple , or gray  Prevention  Here are steps you can take to help prevent an infection:     Keep good hand washing habits.    Don t have close contact with people who have sore throats, colds, or other upper respiratory infections.    Don t smoke, and stay away from secondhand smoke.    Stay up to date with of your vaccines.  [employer] last reviewed this educational content on 12/1/2019 2000-2021 [employer]. All rights  reserved. This information is not intended as a substitute for professional medical care. Always follow your healthcare professional's instructions.

## 2021-10-11 ENCOUNTER — HEALTH MAINTENANCE LETTER (OUTPATIENT)
Age: 41
End: 2021-10-11

## 2022-01-30 ENCOUNTER — HEALTH MAINTENANCE LETTER (OUTPATIENT)
Age: 42
End: 2022-01-30

## 2022-09-24 ENCOUNTER — HEALTH MAINTENANCE LETTER (OUTPATIENT)
Age: 42
End: 2022-09-24

## 2022-12-13 ENCOUNTER — OFFICE VISIT (OUTPATIENT)
Dept: FAMILY MEDICINE | Facility: OTHER | Age: 42
End: 2022-12-13
Payer: COMMERCIAL

## 2022-12-13 VITALS
TEMPERATURE: 98 F | HEART RATE: 86 BPM | RESPIRATION RATE: 18 BRPM | BODY MASS INDEX: 25.84 KG/M2 | SYSTOLIC BLOOD PRESSURE: 122 MMHG | HEIGHT: 68 IN | DIASTOLIC BLOOD PRESSURE: 62 MMHG | OXYGEN SATURATION: 99 % | WEIGHT: 170.5 LBS

## 2022-12-13 DIAGNOSIS — K14.6 BURNING TONGUE: ICD-10-CM

## 2022-12-13 DIAGNOSIS — F17.200 CURRENT EVERY DAY SMOKER: ICD-10-CM

## 2022-12-13 DIAGNOSIS — Z00.00 ROUTINE GENERAL MEDICAL EXAMINATION AT A HEALTH CARE FACILITY: Primary | ICD-10-CM

## 2022-12-13 DIAGNOSIS — E63.9 POOR DIET: ICD-10-CM

## 2022-12-13 DIAGNOSIS — K13.79 ORAL MASS: ICD-10-CM

## 2022-12-13 PROBLEM — F10.20 ALCOHOL USE DISORDER, SEVERE, DEPENDENCE (H): Status: ACTIVE | Noted: 2022-02-19

## 2022-12-13 PROBLEM — E78.2 MIXED HYPERLIPIDEMIA: Status: ACTIVE | Noted: 2022-02-01

## 2022-12-13 LAB
ALBUMIN SERPL-MCNC: 4 G/DL (ref 3.4–5)
ALP SERPL-CCNC: 122 U/L (ref 40–150)
ALT SERPL W P-5'-P-CCNC: 54 U/L (ref 0–70)
ANION GAP SERPL CALCULATED.3IONS-SCNC: 2 MMOL/L (ref 3–14)
AST SERPL W P-5'-P-CCNC: 21 U/L (ref 0–45)
BASOPHILS # BLD AUTO: 0 10E3/UL (ref 0–0.2)
BASOPHILS NFR BLD AUTO: 0 %
BILIRUB SERPL-MCNC: 0.5 MG/DL (ref 0.2–1.3)
BUN SERPL-MCNC: 11 MG/DL (ref 7–30)
CALCIUM SERPL-MCNC: 8.4 MG/DL (ref 8.5–10.1)
CHLORIDE BLD-SCNC: 109 MMOL/L (ref 94–109)
CHOLEST SERPL-MCNC: 196 MG/DL
CO2 SERPL-SCNC: 29 MMOL/L (ref 20–32)
CREAT SERPL-MCNC: 0.64 MG/DL (ref 0.66–1.25)
EOSINOPHIL # BLD AUTO: 0.1 10E3/UL (ref 0–0.7)
EOSINOPHIL NFR BLD AUTO: 2 %
ERYTHROCYTE [DISTWIDTH] IN BLOOD BY AUTOMATED COUNT: 12.7 % (ref 10–15)
FASTING STATUS PATIENT QL REPORTED: YES
GFR SERPL CREATININE-BSD FRML MDRD: >90 ML/MIN/1.73M2
GLUCOSE BLD-MCNC: 100 MG/DL (ref 70–99)
HCT VFR BLD AUTO: 41.4 % (ref 40–53)
HDLC SERPL-MCNC: 33 MG/DL
HGB BLD-MCNC: 14.8 G/DL (ref 13.3–17.7)
LDLC SERPL CALC-MCNC: 127 MG/DL
LYMPHOCYTES # BLD AUTO: 1.5 10E3/UL (ref 0.8–5.3)
LYMPHOCYTES NFR BLD AUTO: 22 %
MCH RBC QN AUTO: 33.6 PG (ref 26.5–33)
MCHC RBC AUTO-ENTMCNC: 35.7 G/DL (ref 31.5–36.5)
MCV RBC AUTO: 94 FL (ref 78–100)
MONOCYTES # BLD AUTO: 0.8 10E3/UL (ref 0–1.3)
MONOCYTES NFR BLD AUTO: 13 %
NEUTROPHILS # BLD AUTO: 4.1 10E3/UL (ref 1.6–8.3)
NEUTROPHILS NFR BLD AUTO: 63 %
NONHDLC SERPL-MCNC: 163 MG/DL
PLATELET # BLD AUTO: 220 10E3/UL (ref 150–450)
POTASSIUM BLD-SCNC: 4.1 MMOL/L (ref 3.4–5.3)
PROT SERPL-MCNC: 7.4 G/DL (ref 6.8–8.8)
RBC # BLD AUTO: 4.4 10E6/UL (ref 4.4–5.9)
SODIUM SERPL-SCNC: 140 MMOL/L (ref 133–144)
TRIGL SERPL-MCNC: 178 MG/DL
TSH SERPL DL<=0.005 MIU/L-ACNC: 0.42 MU/L (ref 0.4–4)
WBC # BLD AUTO: 6.5 10E3/UL (ref 4–11)

## 2022-12-13 PROCEDURE — 36415 COLL VENOUS BLD VENIPUNCTURE: CPT | Performed by: PHYSICIAN ASSISTANT

## 2022-12-13 PROCEDURE — 80050 GENERAL HEALTH PANEL: CPT | Performed by: PHYSICIAN ASSISTANT

## 2022-12-13 PROCEDURE — 80061 LIPID PANEL: CPT | Performed by: PHYSICIAN ASSISTANT

## 2022-12-13 PROCEDURE — 99386 PREV VISIT NEW AGE 40-64: CPT | Performed by: PHYSICIAN ASSISTANT

## 2022-12-13 RX ORDER — LANOLIN ALCOHOL/MO/W.PET/CERES
100 CREAM (GRAM) TOPICAL
COMMUNITY
Start: 2022-02-22 | End: 2022-12-13

## 2022-12-13 RX ORDER — FOLIC ACID 1 MG/1
1 TABLET ORAL
COMMUNITY
Start: 2022-02-22 | End: 2022-12-13

## 2022-12-13 RX ORDER — MIRTAZAPINE 15 MG/1
15 TABLET, FILM COATED ORAL
COMMUNITY
Start: 2022-02-21 | End: 2022-12-13

## 2022-12-13 RX ORDER — NYSTATIN 100000/ML
500000 SUSPENSION, ORAL (FINAL DOSE FORM) ORAL 4 TIMES DAILY
Qty: 100 ML | Refills: 0 | Status: SHIPPED | OUTPATIENT
Start: 2022-12-13 | End: 2022-12-18

## 2022-12-13 ASSESSMENT — ENCOUNTER SYMPTOMS
HEMATOCHEZIA: 0
NAUSEA: 0
DYSURIA: 0
NERVOUS/ANXIOUS: 0
FREQUENCY: 0
SORE THROAT: 0
FEVER: 0
COUGH: 1
HEMATURIA: 0
JOINT SWELLING: 0
PARESTHESIAS: 0
SHORTNESS OF BREATH: 0
CONSTIPATION: 0
PALPITATIONS: 0
MYALGIAS: 0
CHILLS: 0
DIZZINESS: 0
WEAKNESS: 0
HEARTBURN: 0
ARTHRALGIAS: 0
EYE PAIN: 0
ABDOMINAL PAIN: 0
DIARRHEA: 0
HEADACHES: 0

## 2022-12-13 ASSESSMENT — PATIENT HEALTH QUESTIONNAIRE - PHQ9
10. IF YOU CHECKED OFF ANY PROBLEMS, HOW DIFFICULT HAVE THESE PROBLEMS MADE IT FOR YOU TO DO YOUR WORK, TAKE CARE OF THINGS AT HOME, OR GET ALONG WITH OTHER PEOPLE: SOMEWHAT DIFFICULT
SUM OF ALL RESPONSES TO PHQ QUESTIONS 1-9: 12
SUM OF ALL RESPONSES TO PHQ QUESTIONS 1-9: 12

## 2022-12-13 ASSESSMENT — PAIN SCALES - GENERAL: PAINLEVEL: NO PAIN (0)

## 2022-12-13 NOTE — LETTER
My Depression Action Plan  Name: Reed Arellano   Date of Birth 1980  Date: 12/13/2022    My doctor: No Ref-Primary, Physician   My clinic: [address]  [facility] [id number] [phone]          GREEN    ZONE   Good Control    What it looks like:     Things are going generally well. You have normal ups and downs. You may even feel depressed from time to time, but bad moods usually last less than a day.   What you need to do:  1. Continue to care for yourself (see self care plan)  2. Check your depression survival kit and update it as needed  3. Follow your physician s recommendations including any medication.  4. Do not stop taking medication unless you consult with your physician first.           YELLOW         ZONE Getting Worse    What it looks like:     Depression is starting to interfere with your life.     It may be hard to get out of bed; you may be starting to isolate yourself from others.    Symptoms of depression are starting to last most all day and this has happened for several days.     You may have suicidal thoughts but they are not constant.   What you need to do:     1. Call your care team. Your response to treatment will improve if you keep your care team informed of your progress. Yellow periods are signs an adjustment may need to be made.     2. Continue your self-care.  Just get dressed and ready for the day.  Don't give yourself time to talk yourself out of it.    3. Talk to someone in your support network.    4. Open up your Depression Self-Care Plan/Wellness Kit.           RED    ZONE Medical Alert - Get Help    What it looks like:     Depression is seriously interfering with your life.     You may experience these or other symptoms: You can t get out of bed most days, can t work or engage in other necessary activities, you have trouble taking care of basic hygiene, or basic responsibilities, thoughts of suicide or  death that will not go away, self-injurious behavior.     What you need to do:  1. Call your care team and request a same-day appointment. If they are not available (weekends or after hours) call your local crisis line, emergency room or 911.          Depression Self-Care Plan / Wellness Kit    Many people find that medication and therapy are helpful treatments for managing depression. In addition, making small changes to your everyday life can help to boost your mood and improve your wellbeing. Below are some tips for you to consider. Be sure to talk with your medical provider and/or behavioral health consultant if your symptoms are worsening or not improving.     Sleep   Sleep hygiene  means all of the habits that support good, restful sleep. It includes maintaining a consistent bedtime and wake time, using your bedroom only for sleeping or sex, and keeping the bedroom dark and free of distractions like a computer, smartphone, or television.     Develop a Healthy Routine  Maintain good hygiene. Get out of bed in the morning, make your bed, brush your teeth, take a shower, and get dressed. Don t spend too much time viewing media that makes you feel stressed. Find time to relax each day.    Exercise  Get some form of exercise every day. This will help reduce pain and release endorphins, the  feel good  chemicals in your brain. It can be as simple as just going for a walk or doing some gardening, anything that will get you moving.      Diet  Strive to eat healthy foods, including fruits and vegetables. Drink plenty of water. Avoid excessive sugar, caffeine, alcohol, and other mood-altering substances.     Stay Connected with Others  Stay in touch with friends and family members.    Manage Your Mood  Try deep breathing, massage therapy, biofeedback, or meditation. Take part in fun activities when you can. Try to find something to smile about each day.     Psychotherapy  Be open to working with a therapist if your  provider recommends it.     Medication  Be sure to take your medication as prescribed. Most anti-depressants need to be taken every day. It usually takes several weeks for medications to work. Not all medicines work for all people. It is important to follow-up with your provider to make sure you have a treatment plan that is working for you. Do not stop your medication abruptly without first discussing it with your provider.    Crisis Resources   These hotlines are for both adults and children. They and are open 24 hours a day, 7 days a week unless noted otherwise.      National Suicide Prevention Lifeline   988 or 8-136-055-RVKA (6959)      Crisis Text Line    www.crisistextline.org  Text HOME to 199049 from anywhere in the [place], anytime, about any type of crisis. A live, trained crisis counselor will receive the text and respond quickly.      [place] Lifeline for LGBTQ Youth  A national crisis intervention and suicide lifeline for LGBTQ youth under 25. Provides a safe place to talk without judgement. Call 1-756.591.8576; text START to 786730 or visit www.thetrevorproject.org to talk to a trained counselor.      For [facility] crisis numbers, visit the [facility] website at:  https://[place].gov/dhs/people-we-serve/adults/health-care/mental-health/resources/crisis-contacts.jsp

## 2022-12-13 NOTE — PROGRESS NOTES
SUBJECTIVE:   CC: Reed is an 42 year old who presents for preventative health visit.     Patient has been advised of split billing requirements and indicates understanding: Yes  Healthy Habits:     Getting at least 3 servings of Calcium per day:  NO    Bi-annual eye exam:  Yes    Dental care twice a year:  NO    Sleep apnea or symptoms of sleep apnea:  None    Diet:  Regular (no restrictions)    Frequency of exercise:  1 day/week    Duration of exercise:  Less than 15 minutes    Taking medications regularly:  No    Barriers to taking medications:  Other    Medication side effects:  Not applicable    PHQ-2 Total Score: 3    Additional concerns today:  Yes    Lump in mouth      Duration: 3 months    Description (location/character/radiation): growth under tongue accompanied by numbness    Intensity:  moderate    Accompanying signs and symptoms: tingling feeling and bad taste    History (similar episodes/previous evaluation): None    Precipitating or alleviating factors: None    Therapies tried and outcome: regular mouthwash       Today's PHQ-2 Score:   PHQ-2 ( 1999 [employer]) 12/13/2022   Q1: Little interest or pleasure in doing things 1   Q2: Feeling down, depressed or hopeless 2   PHQ-2 Score 3   PHQ-2 Total Score (12-17 Years)- Positive if 3 or more points; Administer PHQ-A if positive -   Q1: Little interest or pleasure in doing things Several days   Q2: Feeling down, depressed or hopeless More than half the days   PHQ-2 Score 3       Have you ever done Advance Care Planning? (For example, a Health Directive, POLST, or a discussion with a medical provider or your loved ones about your wishes): No, advance care planning information given to patient to review.  Advanced care planning was discussed at today's visit.    Social History     Tobacco Use     Smoking status: Every Day     Packs/day: 1.00     Types: Cigarettes     Passive exposure: Never     Smokeless tobacco: Never   Substance Use Topics     Alcohol use: Not  "Currently     Comment: weekends, 2-3 nights a week     If you drink alcohol do you typically have >3 drinks per day or >7 drinks per week? Yes    Alcohol Use 12/13/2022   Prescreen: >3 drinks/day or >7 drinks/week? Yes   Prescreen: >3 drinks/day or >7 drinks/week? -   AUDIT SCORE  25       Last PSA: No results found for: PSA    Reviewed orders with patient. Reviewed health maintenance and updated orders accordingly - Yes    Reviewed and updated as needed this visit by clinical staff   Tobacco   Meds              Reviewed and updated as needed this visit by Provider    Review of Systems   Constitutional: Negative for chills and fever.   HENT: Positive for congestion. Negative for ear pain, hearing loss and sore throat.    Eyes: Negative for pain and visual disturbance.   Respiratory: Positive for cough. Negative for shortness of breath.    Cardiovascular: Negative for chest pain, palpitations and peripheral edema.   Gastrointestinal: Negative for abdominal pain, constipation, diarrhea, heartburn, hematochezia and nausea.   Genitourinary: Negative for dysuria, frequency, genital sores, hematuria, impotence, penile discharge and urgency.   Musculoskeletal: Negative for arthralgias, joint swelling and myalgias.   Skin: Negative for rash.   Neurological: Negative for dizziness, weakness, headaches and paresthesias.   Psychiatric/Behavioral: Negative for mood changes. The patient is not nervous/anxious.      OBJECTIVE:   /62 (Cuff Size: Adult Regular)   Pulse 86   Temp 98  F (36.7  C) (Temporal)   Resp 18   Ht 5' 8\" (1.727 m)   Wt 170 lb 8 oz (77.3 kg)   SpO2 99%   BMI 25.92 kg/m      Physical Exam  GENERAL: healthy, alert and no distress  EYES: Eyes grossly normal to inspection, PERRL and conjunctivae and sclerae normal  HENT: small nodular mass in the anterior lower oral cavity, center, just inside false teeth. White film over the tongue.   NECK: no adenopathy, no asymmetry, masses, or scars and thyroid " normal to palpation  RESP: lungs clear to auscultation - no rales, rhonchi or wheezes  CV: regular rate and rhythm, normal S1 S2, no S3 or S4, no murmur, click or rub, no peripheral edema and peripheral pulses strong  ABDOMEN: soft, nontender, no hepatosplenomegaly, no masses and bowel sounds normal  MS: no gross musculoskeletal defects noted, no edema  NEURO: Normal strength and tone, mentation intact and speech normal  PSYCH: mentation appears normal, affect normal/bright    Diagnostic Test Results:  Results for orders placed or performed in visit on 12/13/22 (from the past 24 hour(s))   CBC with platelets and differential    Narrative    The following orders were created for panel order CBC with platelets and differential.  Procedure                               Abnormality         Status                     ---------                               -----------         ------                     CBC with platelets and d...[[id number]]  Abnormal            Final result                 Please view results for these tests on the individual orders.   CBC with platelets and differential   Result Value Ref Range    WBC Count 6.5 4.0 - 11.0 10e3/uL    RBC Count 4.40 4.40 - 5.90 10e6/uL    Hemoglobin 14.8 13.3 - 17.7 g/dL    Hematocrit 41.4 40.0 - 53.0 %    MCV 94 78 - 100 fL    MCH 33.6 (H) 26.5 - 33.0 pg    MCHC 35.7 31.5 - 36.5 g/dL    RDW 12.7 10.0 - 15.0 %    Platelet Count 220 150 - 450 10e3/uL    % Neutrophils 63 %    % Lymphocytes 22 %    % Monocytes 13 %    % Eosinophils 2 %    % Basophils 0 %    Absolute Neutrophils 4.1 1.6 - 8.3 10e3/uL    Absolute Lymphocytes 1.5 0.8 - 5.3 10e3/uL    Absolute Monocytes 0.8 0.0 - 1.3 10e3/uL    Absolute Eosinophils 0.1 0.0 - 0.7 10e3/uL    Absolute Basophils 0.0 0.0 - 0.2 10e3/uL       ASSESSMENT/PLAN:       ICD-10-CM    1. Routine general medical examination at a health care facility  Z00.00 Comprehensive metabolic panel (BMP + Alb, Alk Phos, ALT, AST, Total. Bili, TP)     CBC  with platelets and differential     Lipid Profile (Chol, Trig, HDL, LDL calc)     TSH with free T4 reflex     Comprehensive metabolic panel (BMP + Alb, Alk Phos, ALT, AST, Total. Bili, TP)     CBC with platelets and differential     Lipid Profile (Chol, Trig, HDL, LDL calc)     TSH with free T4 reflex      2. Current every day smoker  F17.200       3. Oral mass  K13.79 Adult ENT  Referral      4. Burning tongue  K14.6 Adult ENT  Referral     nystatin (MYCOSTATIN) 097721 UNIT/ML suspension      5. Poor diet  E63.9           Patient has been advised of split billing requirements and indicates understanding: Yes      COUNSELING:   Reviewed preventive health counseling, as reflected in patient instructions       Regular exercise       Healthy diet/nutrition       Alcohol Use        Advance Care Planning        He reports that he has been smoking cigarettes. He has been smoking an average of 1 pack per day. He has never been exposed to tobacco smoke. He has never used smokeless tobacco.  Nicotine/Tobacco Cessation Plan:   Information offered: Patient not interested at this time    Miguel Chavez PA-C  [facility]

## 2022-12-19 ENCOUNTER — TELEPHONE (OUTPATIENT)
Dept: FAMILY MEDICINE | Facility: OTHER | Age: 42
End: 2022-12-19

## 2022-12-19 ENCOUNTER — TELEPHONE (OUTPATIENT)
Dept: OTOLARYNGOLOGY | Facility: CLINIC | Age: 42
End: 2022-12-19

## 2022-12-19 DIAGNOSIS — E78.1 HYPERTRIGLYCERIDEMIA: Primary | ICD-10-CM

## 2022-12-19 NOTE — TELEPHONE ENCOUNTER
Spoke with Reed and read back lab results message from 12/13/22.    Patient completely understood and is asking if he could have a referral for a nutritionist to help him better get a grasp on eating a healthier/balanced diet    Last OV 12/13/22    Dominga Brewster RN  [facility] ~ Registered Nurse  Clinic Triage ~ [facility]  December 19, 2022

## 2022-12-19 NOTE — TELEPHONE ENCOUNTER
Wife called about test results. They have receive their results, but would like a call back to discuss to further understand the results.     Please call [phone]

## 2022-12-19 NOTE — TELEPHONE ENCOUNTER
[facility] Call Center    Phone Message    May a detailed message be left on voicemail: yes     Reason for Call: Appointment Intake    Referring Provider Name: Miguel Chavez PA-C in [facility]  Diagnosis and/or Symptoms: Burning tongue [K14.6]  Oral mass [K13.79]    Action Taken: Message routed to:  [facility] ([facility] ENT    Travel Screening: NOT APPLICABLE

## 2022-12-20 ENCOUNTER — TELEPHONE (OUTPATIENT)
Dept: OTOLARYNGOLOGY | Facility: CLINIC | Age: 42
End: 2022-12-20

## 2022-12-20 NOTE — TELEPHONE ENCOUNTER
If the patient would like, they can have their Dr. Toure appt on 2/28 rescheduled.     Per provider- He can go 1/17 or a later date. Okay to overbook but please don't triple book

## 2022-12-20 NOTE — TELEPHONE ENCOUNTER
FUTURE VISIT INFORMATION      FUTURE VISIT INFORMATION:    Date: 2/28/23    Time: 2:20pm    Location: [facility]  REFERRAL INFORMATION:    Referring provider:  Miguel Chavez PA-C     Referring providers clinic:  [facility]    Reason for visit/diagnosis  New per pt/ref for Burning tongue [K14.6], Oral mass [K13.79], ref prov: Miguel Chavez PA-C in [facility], recs/ref in epic, confirmed [facility] location    RECORDS REQUESTED FROM:       Clinic name Comments Records Status Imaging Status   [facility]  12/13/22- note with Miguel Chavez PA-C  Epic

## 2023-01-17 ENCOUNTER — TELEPHONE (OUTPATIENT)
Dept: OTOLARYNGOLOGY | Facility: CLINIC | Age: 43
End: 2023-01-17

## 2023-01-17 ENCOUNTER — OFFICE VISIT (OUTPATIENT)
Dept: OTOLARYNGOLOGY | Facility: CLINIC | Age: 43
End: 2023-01-17
Attending: PHYSICIAN ASSISTANT
Payer: COMMERCIAL

## 2023-01-17 VITALS
HEIGHT: 68 IN | BODY MASS INDEX: 26.98 KG/M2 | SYSTOLIC BLOOD PRESSURE: 133 MMHG | OXYGEN SATURATION: 96 % | DIASTOLIC BLOOD PRESSURE: 77 MMHG | HEART RATE: 89 BPM | WEIGHT: 178 LBS

## 2023-01-17 DIAGNOSIS — K13.79 ORAL MASS: ICD-10-CM

## 2023-01-17 DIAGNOSIS — K14.6 BURNING TONGUE: ICD-10-CM

## 2023-01-17 PROCEDURE — 99243 OFF/OP CNSLTJ NEW/EST LOW 30: CPT | Performed by: OTOLARYNGOLOGY

## 2023-01-17 RX ORDER — NYSTATIN 100000/ML
SUSPENSION, ORAL (FINAL DOSE FORM) ORAL 3 TIMES DAILY
Qty: 420 ML | Refills: 0 | Status: SHIPPED | OUTPATIENT
Start: 2023-01-17 | End: 2023-02-07

## 2023-01-17 ASSESSMENT — PAIN SCALES - GENERAL: PAINLEVEL: NO PAIN (0)

## 2023-01-17 NOTE — PATIENT INSTRUCTIONS
"You were seen in the clinic today by Dr. Toure. If you have any questions or concerns after your appointment, please call the clinic at [phone]. Press \"1\" for scheduling, press \"3\" for nurse advice.    2.   The following has been recommended for you based upon your appointment today:   - Nystatin suspension has been prescribed as a substitute for the Mycelex troches to be used as directed.   -A prescription for Magic Mouthwash has been sent to your selected pharmacy.   -Use both of these prescriptions 3 times daily for three weeks.    3.   Plan to return the clinic in 1 month for follow-up.    Lachelle ESQUIVEL, RN  [facility]  Department of Otolaryngology  [phone]      "

## 2023-01-17 NOTE — NURSING NOTE
"No chief complaint on file.      Blood pressure 133/77, pulse 89, height 1.727 m (5' 8\"), weight 80.7 kg (178 lb), SpO2 96 %.    Susannah Matos, EMT    "

## 2023-01-17 NOTE — LETTER
2023       RE: Reed Arellano  [address] [place] [id number]     Dear Colleague,    Thank you for referring your patient, Reed Arellano, to the [facility] [place] at [facility]. Please see a copy of my visit note below.      Otolaryngology Clinic      Name: Reed Arellano  MRN: [id number]  Age: 42 year old  : 1980  Referring provider: Miguel Chavez  2023      Chief Complaint:  Consultation    History of Present Illness:   Reed Arellano is a 42 year old male with a history of smoking who presents for consultation regarding burning tongue and oral mass. The patient was evaluated on 22 and endorsed 3 months of growth under his tongue accompanied by numbness/tingling and a terrible taste in his mouth. He does use mouthwash for this. Patient was prescribed nystatin, then referred to us.    Today, he endorses a growth underneath the tongue with an accompanying bad taste.     Active Medications:   No current outpatient medications on file.      Allergies:   Patient has no known allergies.      Past Medical History:  No past medical history on file.  Patient Active Problem List   Diagnosis     Tobacco use disorder     Chronic diarrhea     Dyslipidemia     Alcohol dependence in remission (H)     Anxiety     Severe episode of recurrent major depressive disorder (H)     Toxic effect of other specified alcohols     Alcohol use disorder, severe, dependence (H)     Mixed hyperlipidemia        Past Surgical History:  No past surgical history on file.    Family History:   No family history on file.      Social History:   Social History     Tobacco Use     Smoking status: Every Day     Packs/day: 1.00     Types: Cigarettes     Passive exposure: Never     Smokeless tobacco: Never   Vaping Use     Vaping Use: Never used   Substance Use Topics     Alcohol use: Not Currently     Comment: weekends, 2-3  "nights a week     Drug use: Never       Review of Systems:   Pertinent items are noted in HPI or as in patient entered ROS below, remainder of complete ROS is negative.   [facility] ROS 1/17/2023   Constitutional: Unexplained fatigue, Problems with sleep   Psychology: Frequently feeling depressed or sad   Gastrointestinal/Genitourinary: Heartburn/indigestion, Diarrhea         Physical Exam:   /77 (BP Location: Right arm, Patient Position: Sitting, Cuff Size: Adult Regular)   Pulse 89   Ht 1.727 m (5' 8\")   Wt 80.7 kg (178 lb)   SpO2 96%   BMI 27.06 kg/m       Constitutional:  The patient was unaccompanied, well-groomed, and in no acute distress.    Skin:  Warm and pink.    Neurologic:  Alert and oriented x 3.  CN's III-XII within normal limits.  Voice normal.   Psychiatric:  The patient's affect was calm, cooperative, and appropriate.   Respiratory:  Breathing comfortably without stridor or exertion of accessory muscles.    Eyes: Extraocular movement intact.    Head:  Normocephalic and atraumatic.  No lesions or scars.    Ears:  Pinnae and tragus non-tender.  EAC's and TM's were clear.   Nose:  Sinuses were non-tender.  Anterior rhinoscopy revealed midline septum and absence of purulence or polyps.    OC/OP:  Normal buccal mucosa, and palate.  No lesions or masses on inspection or palpation.  No abnormal lymph tissue in the oropharynx.  The pterygoid region is non-tender. Floor of mouth where his plate is, there is growth adjacent from rubbing, non-bleeding, and exophytic. Dorsum of tongue has multiple white patches.   Neck:  Supple with normal laryngeal and tracheal landmarks.  The parotid beds were without masses.  No palpable thyroid.  Lymphatic:  There is no palpable lymphadenopathy in the neck.     Assessment and Plan:  Reed Arellano is a 42 year old male with a history of smoking who presents for consultation regarding burning tongue and oral mass. On exam, there is growth that appears to be fungal in " nature on the tongue. I will prescribe him Mycelex lozenges and Magic mouthwash. If it does not improve with these interventions, we can always biopsy the growth. He will follow up in a month.     Scribe Disclosure:  I, Lorena Cunningham, am serving as a scribe to document services personally performed by Nikita Toure MD at this visit, based upon the provider's statements to me. All documentation has been reviewed by the aforementioned provider prior to being entered into the official medical record.         Again, thank you for allowing me to participate in the care of your patient.      Sincerely,    Nikita Toure MD

## 2023-01-17 NOTE — PROGRESS NOTES
Otolaryngology Clinic      Name: Reed Arellano  MRN: [id number]  Age: 42 year old  : 1980  Referring provider: Miguel Chavez  2023      Chief Complaint:  Consultation    History of Present Illness:   Reed Arellano is a 42 year old male with a history of smoking who presents for consultation regarding burning tongue and oral mass. The patient was evaluated on 22 and endorsed 3 months of growth under his tongue accompanied by numbness/tingling and a terrible taste in his mouth. He does use mouthwash for this. Patient was prescribed nystatin, then referred to us.    Today, he endorses a growth underneath the tongue with an accompanying bad taste.     Active Medications:   No current outpatient medications on file.      Allergies:   Patient has no known allergies.      Past Medical History:  No past medical history on file.  Patient Active Problem List   Diagnosis     Tobacco use disorder     Chronic diarrhea     Dyslipidemia     Alcohol dependence in remission (H)     Anxiety     Severe episode of recurrent major depressive disorder (H)     Toxic effect of other specified alcohols     Alcohol use disorder, severe, dependence (H)     Mixed hyperlipidemia        Past Surgical History:  No past surgical history on file.    Family History:   No family history on file.      Social History:   Social History     Tobacco Use     Smoking status: Every Day     Packs/day: 1.00     Types: Cigarettes     Passive exposure: Never     Smokeless tobacco: Never   Vaping Use     Vaping Use: Never used   Substance Use Topics     Alcohol use: Not Currently     Comment: weekends, 2-3 nights a week     Drug use: Never       Review of Systems:   Pertinent items are noted in HPI or as in patient entered ROS below, remainder of complete ROS is negative.   [facility] ROS 2023   Constitutional: Unexplained fatigue, Problems with sleep   Psychology: Frequently feeling depressed or sad   Gastrointestinal/Genitourinary:  "Heartburn/indigestion, Diarrhea         Physical Exam:   /77 (BP Location: Right arm, Patient Position: Sitting, Cuff Size: Adult Regular)   Pulse 89   Ht 1.727 m (5' 8\")   Wt 80.7 kg (178 lb)   SpO2 96%   BMI 27.06 kg/m       Constitutional:  The patient was unaccompanied, well-groomed, and in no acute distress.    Skin:  Warm and pink.    Neurologic:  Alert and oriented x 3.  CN's III-XII within normal limits.  Voice normal.   Psychiatric:  The patient's affect was calm, cooperative, and appropriate.   Respiratory:  Breathing comfortably without stridor or exertion of accessory muscles.    Eyes: Extraocular movement intact.    Head:  Normocephalic and atraumatic.  No lesions or scars.    Ears:  Pinnae and tragus non-tender.  EAC's and TM's were clear.   Nose:  Sinuses were non-tender.  Anterior rhinoscopy revealed midline septum and absence of purulence or polyps.    OC/OP:  Normal buccal mucosa, and palate.  No lesions or masses on inspection or palpation.  No abnormal lymph tissue in the oropharynx.  The pterygoid region is non-tender. Floor of mouth where his plate is, there is growth adjacent from rubbing, non-bleeding, and exophytic. Dorsum of tongue has multiple white patches.   Neck:  Supple with normal laryngeal and tracheal landmarks.  The parotid beds were without masses.  No palpable thyroid.  Lymphatic:  There is no palpable lymphadenopathy in the neck.     Assessment and Plan:  Reed Arellano is a 42 year old male with a history of smoking who presents for consultation regarding burning tongue and oral mass. On exam, there is growth that appears to be fungal in nature on the tongue. I will prescribe him Mycelex lozenges and Magic mouthwash. If it does not improve with these interventions, we can always biopsy the growth. He will follow up in a month.     Scribe Disclosure:  I, Lorena Cunningham, am serving as a scribe to document services personally performed by Nikita Toure MD at this " visit, based upon the provider's statements to me. All documentation has been reviewed by the aforementioned provider prior to being entered into the official medical record.

## 2023-01-17 NOTE — TELEPHONE ENCOUNTER
[facility] Call Center    Phone Message    May a detailed message be left on voicemail: yes     Reason for Call: Other: Pharmacy calling to clarify if Pt should be getting losinges instead of liquid for their prescriptions, they would like a call to confirm, thanks     Action Taken: Other: ENT    Travel Screening: Not Applicable

## 2023-02-14 ENCOUNTER — OFFICE VISIT (OUTPATIENT)
Dept: OTOLARYNGOLOGY | Facility: CLINIC | Age: 43
End: 2023-02-14
Payer: COMMERCIAL

## 2023-02-14 DIAGNOSIS — K14.6 BURNING TONGUE: Primary | ICD-10-CM

## 2023-02-14 PROCEDURE — 40808 BIOPSY OF MOUTH LESION: CPT | Performed by: OTOLARYNGOLOGY

## 2023-02-14 PROCEDURE — 88305 TISSUE EXAM BY PATHOLOGIST: CPT | Mod: 26 | Performed by: PATHOLOGY

## 2023-02-14 PROCEDURE — 99213 OFFICE O/P EST LOW 20 MIN: CPT | Mod: 25 | Performed by: OTOLARYNGOLOGY

## 2023-02-14 PROCEDURE — 88305 TISSUE EXAM BY PATHOLOGIST: CPT | Mod: TC | Performed by: OTOLARYNGOLOGY

## 2023-02-14 RX ORDER — FLUCONAZOLE 100 MG/1
100 TABLET ORAL DAILY
Qty: 7 TABLET | Refills: 0 | Status: SHIPPED | OUTPATIENT
Start: 2023-02-14 | End: 2023-02-21

## 2023-02-14 NOTE — LETTER
2023       RE: Reed Arellano  [address] [place] [id number]     Dear Colleague,    Thank you for referring your patient, Reed Arellano, to the [facility] [place] at [facility]. Please see a copy of my visit note below.      Otolaryngology Clinic    Name: Reed Arellano  MRN: [id number]  Age: 42 year old  : 2023      Chief Complaint:   Follow up     History of Present Illness:   Reed Arellano is a 42 year old male with a history of smoking who presents for follow up. At his initial visit on 23, he was started on Mycelex lozenges and Magic mouthwash.    Today, he reports that there was a mix up in regards to the Mycelex lozenge prescription, and he was given some other liquid medication instead, which had a numbing effect on his tongue. He does feel that the medication helped slightly. His tongue is sore here today. Of note, patient has a 25 year smoking history.     Review of Systems:   Pertinent items are noted in HPI or as in patient entered ROS below, remainder of complete ROS is negative.    ENT ROS 2023   Constitutional: Unexplained fatigue, Problems with sleep   Psychology: Frequently feeling depressed or sad   Gastrointestinal/Genitourinary: Heartburn/indigestion, Diarrhea        Physical Exam:   There were no vitals taken for this visit.     PHYSICAL EXAMINATION:    Constitutional:  The patient was unaccompanied, well-groomed, and in no acute distress.    Skin:  Warm and pink.    Neurologic:  Alert and oriented x 3.  CN's III-XII within normal limits.  Voice normal.   Psychiatric:  The patient's affect was calm, cooperative, and appropriate.   Respiratory:  Breathing comfortably without stridor or exertion of accessory muscles.    Eyes: Extraocular movement intact.    Head:  Normocephalic and atraumatic.  No lesions or scars.    Ears:  Pinnae and tragus non-tender.   EAC's and TM's were clear.    Nose:  Sinuses were non-tender.  Anterior rhinoscopy revealed midline septum and absence of purulence or polyps.    OC/OP:  Normal tongue, buccal mucosa, and palate. No abnormal lymph tissue in the oropharynx. The pterygoid region is non-tender. There appears to be a permanent lower dental arch implant that completely covers the gingival lingual sulcus anteriorly and posteriorly in oral cavity. It appears that there may be a 2 cm lesion lateral dimension  underneath the implant as well as mechanical-appearing granulation from where the implant rubs the floor of the mouth. There is no bleeding. This is the area that was anesthestized and biopsied in 4 separate areas.  Neck:  Supple with normal laryngeal and tracheal landmarks.  The parotid beds were without masses.  No palpable thyroid.  Lymphatic:  There is no palpable lymphadenopathy in the neck.      Assessment and Plan:  Reed Arellano is a 42 year old male with a history of smoking who presents for follow up. On exam, there is a lesion in the mouth that was anesthestized and biopsied in 4 separate areas here today. We will prescribe him diflucan instead of the Mycelex lozenges. He will follow up after the biopsy results.  There is an unusual implant over the lower gums that obscures a large amount of the dental arch so very difficult to fully assess/     Scribe Disclosure:  I, Lorena Cunningham, am serving as a scribe to document services personally performed by Nikita Toure MD at this visit, based upon the provider's statements to me. All documentation has been reviewed by the aforementioned provider prior to being entered into the official medical record.         Again, thank you for allowing me to participate in the care of your patient.      Sincerely,    Nikita Toure MD

## 2023-02-14 NOTE — PROGRESS NOTES
Otolaryngology Clinic    Name: Reed Arellano  MRN: [id number]  Age: 42 year old  : 2023      Chief Complaint:   Follow up     History of Present Illness:   Reed Arellano is a 42 year old male with a history of smoking who presents for follow up. At his initial visit on 23, he was started on Mycelex lozenges and Magic mouthwash.    Today, he reports that there was a mix up in regards to the Mycelex lozenge prescription, and he was given some other liquid medication instead, which had a numbing effect on his tongue. He does feel that the medication helped slightly. His tongue is sore here today. Of note, patient has a 25 year smoking history.     Review of Systems:   Pertinent items are noted in HPI or as in patient entered ROS below, remainder of complete ROS is negative.    ENT ROS 2023   Constitutional: Unexplained fatigue, Problems with sleep   Psychology: Frequently feeling depressed or sad   Gastrointestinal/Genitourinary: Heartburn/indigestion, Diarrhea        Physical Exam:   There were no vitals taken for this visit.     PHYSICAL EXAMINATION:    Constitutional:  The patient was unaccompanied, well-groomed, and in no acute distress.    Skin:  Warm and pink.    Neurologic:  Alert and oriented x 3.  CN's III-XII within normal limits.  Voice normal.   Psychiatric:  The patient's affect was calm, cooperative, and appropriate.   Respiratory:  Breathing comfortably without stridor or exertion of accessory muscles.    Eyes: Extraocular movement intact.    Head:  Normocephalic and atraumatic.  No lesions or scars.    Ears:  Pinnae and tragus non-tender.  EAC's and TM's were clear.    Nose:  Sinuses were non-tender.  Anterior rhinoscopy revealed midline septum and absence of purulence or polyps.    OC/OP:  Normal tongue, buccal mucosa, and palate. No abnormal lymph tissue in the oropharynx. The pterygoid region is non-tender. There appears to be a permanent lower dental arch  implant that completely covers the gingival lingual sulcus anteriorly and posteriorly in oral cavity. It appears that there may be a 2 cm lesion lateral dimension  underneath the implant as well as mechanical-appearing granulation from where the implant rubs the floor of the mouth. There is no bleeding. This is the area that was anesthestized and biopsied in 4 separate areas.  Neck:  Supple with normal laryngeal and tracheal landmarks.  The parotid beds were without masses.  No palpable thyroid.  Lymphatic:  There is no palpable lymphadenopathy in the neck.      Assessment and Plan:  Reed Arellano is a 42 year old male with a history of smoking who presents for follow up. On exam, there is a lesion in the mouth that was anesthestized and biopsied in 4 separate areas here today. We will prescribe him diflucan instead of the Mycelex lozenges. He will follow up after the biopsy results.  There is an unusual implant over the lower gums that obscures a large amount of the dental arch so very difficult to fully assess/     Scribe Disclosure:  I, Lorena Cunningham, am serving as a scribe to document services personally performed by Nikita Toure MD at this visit, based upon the provider's statements to me. All documentation has been reviewed by the aforementioned provider prior to being entered into the official medical record.

## 2023-02-17 LAB
PATH REPORT.COMMENTS IMP SPEC: NORMAL
PATH REPORT.COMMENTS IMP SPEC: NORMAL
PATH REPORT.FINAL DX SPEC: NORMAL
PATH REPORT.GROSS SPEC: NORMAL
PATH REPORT.MICROSCOPIC SPEC OTHER STN: NORMAL
PATH REPORT.RELEVANT HX SPEC: NORMAL
PHOTO IMAGE: NORMAL

## 2023-02-28 ENCOUNTER — PRE VISIT (OUTPATIENT)
Dept: OTOLARYNGOLOGY | Facility: CLINIC | Age: 43
End: 2023-02-28

## 2023-03-07 ENCOUNTER — TELEPHONE (OUTPATIENT)
Dept: OTOLARYNGOLOGY | Facility: CLINIC | Age: 43
End: 2023-03-07
Payer: COMMERCIAL

## 2023-03-10 ENCOUNTER — TELEPHONE (OUTPATIENT)
Dept: OTOLARYNGOLOGY | Facility: CLINIC | Age: 43
End: 2023-03-10
Payer: COMMERCIAL

## 2023-03-29 ENCOUNTER — MYC MEDICAL ADVICE (OUTPATIENT)
Dept: OTOLARYNGOLOGY | Facility: CLINIC | Age: 43
End: 2023-03-29
Payer: COMMERCIAL

## 2023-03-29 DIAGNOSIS — K14.6 BURNING TONGUE: Primary | ICD-10-CM

## 2023-03-29 RX ORDER — CLOTRIMAZOLE 10 MG/1
10 LOZENGE ORAL
Qty: 150 LOZENGE | Refills: 1 | Status: SHIPPED | OUTPATIENT
Start: 2023-03-29 | End: 2023-05-28

## 2023-03-29 NOTE — TELEPHONE ENCOUNTER
This writer [facility]eng[facility]  Shelley Gray LPN

## 2023-12-01 ENCOUNTER — TELEPHONE (OUTPATIENT)
Dept: FAMILY MEDICINE | Facility: OTHER | Age: 43
End: 2023-12-01

## 2023-12-01 NOTE — TELEPHONE ENCOUNTER
Patient Quality Outreach    Patient is due for the following:   Depression  -  PHQ-9 needed    Next Steps:   Patient was assigned appropriate questionnaire to complete    Type of outreach:    Sent [employer] message.      Questions for provider review:    None           Rachel Cross, CMA

## 2024-12-01 ENCOUNTER — HEALTH MAINTENANCE LETTER (OUTPATIENT)
Age: 44
End: 2024-12-01

## (undated) RX ORDER — LIDOCAINE HYDROCHLORIDE 20 MG/ML
SOLUTION OROPHARYNGEAL
Status: DISPENSED
Start: 2023-02-14

## (undated) RX ORDER — LIDOCAINE HYDROCHLORIDE AND EPINEPHRINE 10; 10 MG/ML; UG/ML
INJECTION, SOLUTION INFILTRATION; PERINEURAL
Status: DISPENSED
Start: 2023-02-14